# Patient Record
Sex: FEMALE | Race: BLACK OR AFRICAN AMERICAN | NOT HISPANIC OR LATINO | URBAN - METROPOLITAN AREA
[De-identification: names, ages, dates, MRNs, and addresses within clinical notes are randomized per-mention and may not be internally consistent; named-entity substitution may affect disease eponyms.]

---

## 2019-04-27 ENCOUNTER — EMERGENCY (EMERGENCY)
Facility: HOSPITAL | Age: 29
LOS: 1 days | Discharge: HOME | End: 2019-04-27
Admitting: EMERGENCY MEDICINE
Payer: MEDICAID

## 2019-04-27 VITALS
HEART RATE: 67 BPM | RESPIRATION RATE: 16 BRPM | DIASTOLIC BLOOD PRESSURE: 73 MMHG | OXYGEN SATURATION: 100 % | SYSTOLIC BLOOD PRESSURE: 119 MMHG

## 2019-04-27 VITALS
HEART RATE: 77 BPM | SYSTOLIC BLOOD PRESSURE: 139 MMHG | DIASTOLIC BLOOD PRESSURE: 90 MMHG | OXYGEN SATURATION: 100 % | TEMPERATURE: 98 F | RESPIRATION RATE: 16 BRPM

## 2019-04-27 PROCEDURE — 72100 X-RAY EXAM L-S SPINE 2/3 VWS: CPT | Mod: 26

## 2019-04-27 PROCEDURE — 99283 EMERGENCY DEPT VISIT LOW MDM: CPT

## 2019-04-27 RX ORDER — KETOROLAC TROMETHAMINE 30 MG/ML
30 SYRINGE (ML) INJECTION ONCE
Qty: 0 | Refills: 0 | Status: DISCONTINUED | OUTPATIENT
Start: 2019-04-27 | End: 2019-04-27

## 2019-04-27 RX ORDER — DIAZEPAM 5 MG
5 TABLET ORAL ONCE
Qty: 0 | Refills: 0 | Status: DISCONTINUED | OUTPATIENT
Start: 2019-04-27 | End: 2019-04-27

## 2019-04-27 RX ORDER — DIAZEPAM 5 MG
1 TABLET ORAL
Qty: 12 | Refills: 0 | OUTPATIENT
Start: 2019-04-27 | End: 2019-04-30

## 2019-04-27 RX ORDER — LIDOCAINE 4 G/100G
1 CREAM TOPICAL ONCE
Qty: 0 | Refills: 0 | Status: COMPLETED | OUTPATIENT
Start: 2019-04-27 | End: 2019-04-27

## 2019-04-27 RX ADMIN — Medication 30 MILLIGRAM(S): at 15:06

## 2019-04-27 RX ADMIN — LIDOCAINE 1 PATCH: 4 CREAM TOPICAL at 15:06

## 2019-04-27 RX ADMIN — Medication 5 MILLIGRAM(S): at 15:06

## 2019-04-27 NOTE — ED PROVIDER NOTE - OBJECTIVE STATEMENT
30 yo F with no significant PMH who presents to the ER c/o worsening of lower back pain for duration of 1 week. Pt states onset of pain is gradual. Reports character of pain is sharp, intermittent,  8/10 in severity, non-radiating. States pain is aggravates with movement, bending, ambulation and alleviates by rest. 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 Pt requests xray of her spine. Denies hx of malignancy, unexplained weight loss, fever, rigors, malaise, recent infection, hx of IVDU, saddle anesthesia/perianal sensory loss, decreased rectal tone, urinary retention, inability to control urine from overflow, weakness, numbness, recent travel, or any other complaints.     [facility]: [id number]

## 2019-04-27 NOTE — ED PROVIDER NOTE - NS CPE EDP MUSC LUMBAR LOC
no midline spine tenderness, + moderate paraspinal tenderness b/l, no erythema, no edema, no obvious deformity b/l.

## 2019-04-27 NOTE — ED PROVIDER NOTE - CLINICAL SUMMARY MEDICAL DECISION MAKING FREE TEXT BOX
28 yo F with no significant PMH who presents to the ER c/o worsening of lower back pain for duration of 1 week. Well appearing female p/w acute lower back pain, non-radiating, a/w heavy lifting of a patient, likely back strain, no red flags on exam, no urinary/fecal incontinence, no weakness, no saddle anesthesia, ambulatory. Pt requests xray spine. Plan: xray lumbar spine, valium, toradol, lidoderm patch, and reassess.

## 2019-04-27 NOTE — ED PROVIDER NOTE - PROGRESS NOTE DETAILS
PA GLADYS: Patient reassessed, sitting comfortably in chair in NAD, denies any complaints. states still having pain. Warm compress given. Pending Xray spine. Spoke with xray tech, states really back up. Will continue to monitor and reassess. PA GLADYS: Patient reassessed, sitting comfortably in chair in NAD, denies any complaints. States feeling better, symptoms improved. xray neg. Pt is medically stable for discharge and follow up with PMD and ortho. The patient was given verbal and written discharge instructions. Specifically, instructions when to return to the ED and when to seek follow-up from their pcp was discussed. Any specialty follow-up was discussed, including how to make an appointment.  Instructions were discussed in simple, plain language and was understood by the patient. The patient understands that should their symptoms worsen or any new symptoms arise, they should return to the ED immediately for further evaluation. All pt's questions were answered. Patient verbalizes understanding.

## 2023-03-09 NOTE — ED ADULT TRIAGE NOTE - BP NONINVASIVE SYSTOLIC (MM HG)
Patient requested call back on 3/10. Discharge Note:    Requested by Dr. Paz to facilitate pt to d/c home.  V/s, labs, diagnostics reviewed, pt stable to d/c now.  Medication reconciliation reviewed, revised, and resolved with Dr. Paz who medically cleared w/ f/u as directed.   Please refer to d/c note for hospital details.    Tayler Lawton, NP-c  [phone]